# Patient Record
Sex: FEMALE | Race: WHITE | Employment: PART TIME | URBAN - METROPOLITAN AREA
[De-identification: names, ages, dates, MRNs, and addresses within clinical notes are randomized per-mention and may not be internally consistent; named-entity substitution may affect disease eponyms.]

---

## 2017-02-27 ENCOUNTER — TELEPHONE (OUTPATIENT)
Dept: NEUROLOGY | Facility: CLINIC | Age: 42
End: 2017-02-27

## 2017-03-16 ENCOUNTER — TELEPHONE (OUTPATIENT)
Dept: NEUROLOGY | Facility: CLINIC | Age: 42
End: 2017-03-16

## 2017-03-16 DIAGNOSIS — G43.001 MIGRAINE WITHOUT AURA AND WITH STATUS MIGRAINOSUS, NOT INTRACTABLE: Primary | ICD-10-CM

## 2017-03-16 DIAGNOSIS — G43.709 CHRONIC MIGRAINE WITHOUT AURA WITHOUT STATUS MIGRAINOSUS, NOT INTRACTABLE: ICD-10-CM

## 2017-03-16 RX ORDER — METHYLPREDNISOLONE 4 MG/1
TABLET ORAL
Qty: 1 KIT | Refills: 0 | Status: SHIPPED | OUTPATIENT
Start: 2017-03-16 | End: 2017-04-17 | Stop reason: ALTCHOICE

## 2017-03-16 NOTE — TELEPHONE ENCOUNTER
Call to [facility] to request lab results drawn 3-9-17  Will fax results. Received labs. To discuss with Dr. Yaron Raya. Patient with history of migraines currently on Topamax 100 mg and Nadalol 20 mg daily with migraine starting on 3-13-17.   Has not respo

## 2017-03-19 DIAGNOSIS — G43.709 CHRONIC MIGRAINE WITHOUT AURA WITHOUT STATUS MIGRAINOSUS, NOT INTRACTABLE: Primary | ICD-10-CM

## 2017-03-20 RX ORDER — TOPIRAMATE 100 MG/1
100 TABLET, FILM COATED ORAL DAILY
Qty: 30 TABLET | Refills: 2 | Status: SHIPPED | OUTPATIENT
Start: 2017-03-20 | End: 2017-07-10

## 2017-03-20 RX ORDER — TOPIRAMATE 25 MG/1
TABLET ORAL
Qty: 120 TABLET | Refills: 5 | OUTPATIENT
Start: 2017-03-20

## 2017-03-20 NOTE — TELEPHONE ENCOUNTER
Medication: Topirimate    Date of last refill: 08/10/16 with 5 addt refills  [place]     Last office visit: 12/15/2016  Due back to clinic per last office note:  RTN in 3 months by 03/15/17  Date next office visit schedu

## 2017-03-30 ENCOUNTER — TELEPHONE (OUTPATIENT)
Dept: NEUROLOGY | Facility: CLINIC | Age: 42
End: 2017-03-30

## 2017-03-30 NOTE — TELEPHONE ENCOUNTER
Left voice-mail message for patient informing her that her vitamin d level was 28.5. Recommended if she is not taking vitamin d to take vitamin d 1000 daily to call back with any questions.

## 2017-03-31 ENCOUNTER — MED REC SCAN ONLY (OUTPATIENT)
Dept: NEUROLOGY | Facility: CLINIC | Age: 42
End: 2017-03-31

## 2017-04-17 ENCOUNTER — TELEPHONE (OUTPATIENT)
Dept: NEUROLOGY | Facility: CLINIC | Age: 42
End: 2017-04-17

## 2017-04-17 ENCOUNTER — OFFICE VISIT (OUTPATIENT)
Dept: NEUROLOGY | Facility: CLINIC | Age: 42
End: 2017-04-17

## 2017-04-17 VITALS
BODY MASS INDEX: 23.3 KG/M2 | DIASTOLIC BLOOD PRESSURE: 76 MMHG | HEART RATE: 76 BPM | WEIGHT: 145 LBS | SYSTOLIC BLOOD PRESSURE: 120 MMHG | HEIGHT: 66 IN | RESPIRATION RATE: 16 BRPM

## 2017-04-17 DIAGNOSIS — G43.709 CHRONIC MIGRAINE WITHOUT AURA WITHOUT STATUS MIGRAINOSUS, NOT INTRACTABLE: ICD-10-CM

## 2017-04-17 DIAGNOSIS — G43.001 MIGRAINE WITHOUT AURA AND WITH STATUS MIGRAINOSUS, NOT INTRACTABLE: Primary | ICD-10-CM

## 2017-04-17 PROCEDURE — 99213 OFFICE O/P EST LOW 20 MIN: CPT | Performed by: OTHER

## 2017-04-17 RX ORDER — CELECOXIB 200 MG/1
200 CAPSULE ORAL DAILY PRN
Qty: 30 CAPSULE | Refills: 3 | COMMUNITY
Start: 2017-04-17

## 2017-04-17 RX ORDER — CELECOXIB 200 MG/1
200 CAPSULE ORAL 2 TIMES DAILY
Qty: 30 CAPSULE | Refills: 3 | Status: SHIPPED | OUTPATIENT
Start: 2017-04-17 | End: 2017-04-17

## 2017-04-17 RX ORDER — MULTIVIT-MIN/IRON/FOLIC ACID/K 18-600-40
4000 CAPSULE ORAL DAILY
COMMUNITY
End: 2018-06-03 | Stop reason: DRUGHIGH

## 2017-04-17 NOTE — PATIENT INSTRUCTIONS
Refill policies:    • Allow 2 business days for refills; controlled substances may take longer.   • Contact your pharmacy at least 5 days prior to running out of medication and have them send an electronic request or submit request through the “request re insurance carrier to obtain pre-certification or prior authorization. Unfortunately, [facility] has seen an increase in denial of payment even though the procedure/test has been pre-certified.   You are strongly encouraged to contact your insurance carrier to v

## 2017-04-17 NOTE — PROGRESS NOTES
[facility] with Ronaldo Charles  12/21/1975  Primary Care Provider:  Destini Williamson    4/17/2017    37year old yo patient being seen for:  Migraines and on last visit had fatigue    Repla hearing fine, tongue midline  No motor and sensory findings  DTRs symmetric. No upper motor neuron signs  Cerebellar, coordination were normal  Gait normal  Tenderness in occipital area.         IMPRESSION & PLANS:  Migraine without aura and with status mi

## 2017-04-17 NOTE — TELEPHONE ENCOUNTER
Pharmacy clarifying orders for Celebrex. Per chart notes, patient to receive Celebrex 200 mg daily, prn. Pharmacy notified. Epic to reflect.

## 2017-04-17 NOTE — PROGRESS NOTES
Patient here to follow up regarding migraines. States the headaches have been ok since last visit. Here to discuss the next steps.

## 2017-06-02 DIAGNOSIS — G43.709 CHRONIC MIGRAINE WITHOUT AURA WITHOUT STATUS MIGRAINOSUS, NOT INTRACTABLE: Primary | ICD-10-CM

## 2017-06-02 RX ORDER — ELETRIPTAN HYDROBROMIDE 40 MG/1
TABLET, FILM COATED ORAL
Qty: 10 TABLET | Refills: 5 | Status: SHIPPED | OUTPATIENT
Start: 2017-06-02 | End: 2018-04-02

## 2017-06-02 NOTE — TELEPHONE ENCOUNTER
Medication: Relpak    Date of last refill: 06/13/16 with 2 addt refills  [place]     Last office visit: 4/17/2017  Due back to clinic per last office note:  RTN in 6 months by 10/17/17  Date next office visit scheduled:

## 2017-07-10 DIAGNOSIS — G43.709 CHRONIC MIGRAINE WITHOUT AURA WITHOUT STATUS MIGRAINOSUS, NOT INTRACTABLE: ICD-10-CM

## 2017-07-10 NOTE — TELEPHONE ENCOUNTER
Medication:  Topiramate 100 mg    Date of last refill: 3/20/2017  [place]    Last office visit: 4/17/2017  Due back to clinic per last office note:  RTC in 6-8 months  Date next office visit schedu

## 2017-07-11 RX ORDER — TOPIRAMATE 100 MG/1
100 TABLET, FILM COATED ORAL DAILY
Qty: 30 TABLET | Refills: 5 | Status: SHIPPED | OUTPATIENT
Start: 2017-07-11 | End: 2018-04-10

## 2017-08-04 DIAGNOSIS — G43.709 CHRONIC MIGRAINE WITHOUT AURA WITHOUT STATUS MIGRAINOSUS, NOT INTRACTABLE: Primary | ICD-10-CM

## 2017-08-04 DIAGNOSIS — IMO0002 CHRONIC MIGRAINE: ICD-10-CM

## 2017-08-04 RX ORDER — NADOLOL 20 MG/1
20 TABLET ORAL DAILY
Qty: 30 TABLET | Refills: 5 | Status: SHIPPED | OUTPATIENT
Start: 2017-08-04 | End: 2018-05-31

## 2017-08-04 RX ORDER — NADOLOL 20 MG/1
20 TABLET ORAL DAILY
Qty: 30 TABLET | Refills: 4 | Status: CANCELLED | OUTPATIENT
Start: 2017-08-04

## 2017-08-04 RX ORDER — METHYLPREDNISOLONE 4 MG/1
TABLET ORAL
Qty: 1 PACKAGE | Refills: 0 | Status: SHIPPED | OUTPATIENT
Start: 2017-08-04 | End: 2018-05-31

## 2017-08-04 NOTE — TELEPHONE ENCOUNTER
Medication:  Nadolol 20 mg     Date of last refill: 10/24/16 with 5 addt refills  [place]     Last office visit: 4/17/2017  Due back to clinic per last office note:  RTC in 6-8 months  Date next of

## 2018-04-02 DIAGNOSIS — G43.709 CHRONIC MIGRAINE WITHOUT AURA WITHOUT STATUS MIGRAINOSUS, NOT INTRACTABLE: ICD-10-CM

## 2018-04-02 RX ORDER — ELETRIPTAN HYDROBROMIDE 40 MG/1
TABLET, FILM COATED ORAL
Qty: 10 TABLET | Refills: 0 | Status: SHIPPED | OUTPATIENT
Start: 2018-04-02 | End: 2018-05-20

## 2018-04-02 NOTE — TELEPHONE ENCOUNTER
Medication: Relpax 40 mg    Date of last refill: 6/2/2017  [place]    Last office visit: 4/17/2017  Due back to clinic per last office note:  RTC in 6-8 months, overdue for follow up  Date next off

## 2018-04-10 DIAGNOSIS — G43.709 CHRONIC MIGRAINE WITHOUT AURA WITHOUT STATUS MIGRAINOSUS, NOT INTRACTABLE: ICD-10-CM

## 2018-04-10 RX ORDER — TOPIRAMATE 100 MG/1
TABLET, FILM COATED ORAL
Qty: 30 TABLET | Refills: 0 | Status: SHIPPED | OUTPATIENT
Start: 2018-04-10 | End: 2018-05-20

## 2018-04-10 NOTE — TELEPHONE ENCOUNTER
Medication: Topiramate 100 mg     Date of last refill: 07/11/17 with 5 addt refills  [place]     Last office visit: 4/17/2017  Due back to clinic per last office note:  RTC in 6-8 months, overdue f

## 2018-05-20 DIAGNOSIS — G43.709 CHRONIC MIGRAINE WITHOUT AURA WITHOUT STATUS MIGRAINOSUS, NOT INTRACTABLE: ICD-10-CM

## 2018-05-20 RX ORDER — TOPIRAMATE 100 MG/1
100 TABLET, FILM COATED ORAL
Qty: 30 TABLET | Refills: 0 | Status: CANCELLED
Start: 2018-05-20

## 2018-05-21 RX ORDER — TOPIRAMATE 100 MG/1
TABLET, FILM COATED ORAL
Qty: 30 TABLET | Refills: 0 | Status: SHIPPED | OUTPATIENT
Start: 2018-05-21 | End: 2018-06-21

## 2018-05-21 RX ORDER — ELETRIPTAN HYDROBROMIDE 40 MG/1
TABLET, FILM COATED ORAL
Qty: 10 TABLET | Refills: 0 | Status: SHIPPED
Start: 2018-05-21 | End: 2018-05-31

## 2018-05-21 NOTE — TELEPHONE ENCOUNTER
From: Deanna Landaverde  Sent: 5/20/2018 4:31 PM CDT  Subject: Medication Renewal Request    Deanna Landaverde would like a refill of the following medications:     Eletriptan Hydrobromide (RELPAX) 40 MG Oral Tab Chintan Lester MD]     TOPIRAMATE 100 MG Or

## 2018-05-21 NOTE — TELEPHONE ENCOUNTER
Medication:Relpak 40 mg     Date of last refill: 04/02/18  [place]     Last office visit: 4/17/2017  Due back to clinic per last office note: [address] [facility] [address] in 6-8 months, overdue for follow up  Date next of

## 2018-05-31 ENCOUNTER — OFFICE VISIT (OUTPATIENT)
Dept: NEUROLOGY | Facility: CLINIC | Age: 43
End: 2018-05-31

## 2018-05-31 ENCOUNTER — LAB ENCOUNTER (OUTPATIENT)
Dept: LAB | Age: 43
End: 2018-05-31
Attending: PHYSICIAN ASSISTANT
Payer: COMMERCIAL

## 2018-05-31 VITALS
RESPIRATION RATE: 16 BRPM | DIASTOLIC BLOOD PRESSURE: 60 MMHG | BODY MASS INDEX: 24.91 KG/M2 | SYSTOLIC BLOOD PRESSURE: 100 MMHG | WEIGHT: 155 LBS | HEIGHT: 66 IN | HEART RATE: 80 BPM

## 2018-05-31 DIAGNOSIS — E55.9 VITAMIN D DEFICIENCY: ICD-10-CM

## 2018-05-31 DIAGNOSIS — E55.9 VITAMIN D DEFICIENCY: Primary | ICD-10-CM

## 2018-05-31 DIAGNOSIS — G43.101 MIGRAINE WITH AURA AND WITH STATUS MIGRAINOSUS, NOT INTRACTABLE: Primary | ICD-10-CM

## 2018-05-31 PROCEDURE — 99213 OFFICE O/P EST LOW 20 MIN: CPT | Performed by: PHYSICIAN ASSISTANT

## 2018-05-31 PROCEDURE — 36415 COLL VENOUS BLD VENIPUNCTURE: CPT | Performed by: PHYSICIAN ASSISTANT

## 2018-05-31 RX ORDER — FROVATRIPTAN SUCCINATE 2.5 MG/1
TABLET, FILM COATED ORAL
Qty: 12 TABLET | Refills: 3 | Status: SHIPPED | OUTPATIENT
Start: 2018-05-31 | End: 2018-12-17

## 2018-05-31 RX ORDER — DIPHENHYDRAMINE HCL 25 MG
50 TABLET ORAL EVERY 6 HOURS PRN
COMMUNITY

## 2018-05-31 NOTE — PROGRESS NOTES
[facility] [employer] with Ronaldo Charles  12/21/1975  Primary Care Provider:  Darya Cast    5/31/2018    40year old female patient being seen for: Migraines     Patient since last visit had mi •  DiphenhydrAMINE HCl 25 MG Oral Tab, Take 50 mg by mouth every 6 (six) hours as needed for Itching., Disp: , Rfl:   •  Frovatriptan Succinate 2.5 MG Oral Tab, Take 1 tab po at onset of headache, may repeat in 2 hours if headache recurs, max 2 tabs in 2 longer acting frova for abortive tx of the headaches. Can combine it with the Celebrex she was given. If it is not effective then instructed to inform us.  If at her follow-up visit headaches continue to be well controlled can consider tapering down the top notes

## 2018-05-31 NOTE — PATIENT INSTRUCTIONS
Refill policies:    • Allow 2-3 business days for refills; controlled substances may take longer.   • Contact your pharmacy at least 5 days prior to running out of medication and have them send an electronic request or submit request through the “request re entire amount billed. Precertification and Prior Authorizations: If your physician has recommended that you have a procedure or additional testing performed.   [facility]) will contact your insurance carrier to obtain pre-certi

## 2018-06-03 ENCOUNTER — TELEPHONE (OUTPATIENT)
Dept: NEUROLOGY | Facility: CLINIC | Age: 43
End: 2018-06-03

## 2018-06-03 DIAGNOSIS — E55.9 VITAMIN D DEFICIENCY: Primary | ICD-10-CM

## 2018-06-03 RX ORDER — ERGOCALCIFEROL (VITAMIN D2) 1250 MCG
CAPSULE ORAL
Qty: 4 CAPSULE | Refills: 2 | Status: SHIPPED | OUTPATIENT
Start: 2018-06-03

## 2018-06-21 DIAGNOSIS — G43.709 CHRONIC MIGRAINE WITHOUT AURA WITHOUT STATUS MIGRAINOSUS, NOT INTRACTABLE: ICD-10-CM

## 2018-06-22 NOTE — TELEPHONE ENCOUNTER
Medication: Topiramate 100 mg    Date of last refill: 05/21/18  [place]     Last office visit: 05/31/18  Due back to clinic per last office note:  RTN in 1 year by 05/31/19  Date next office visit scheduled:  No future a ( ) As needed but knows to call if there are problems  ( ) Followup for special test  /or keep scheduled appointment  Patient understands that if needed, based on condition and or test results, follow up will be readjusted        MANDEEP Goel

## 2018-06-25 RX ORDER — TOPIRAMATE 100 MG/1
TABLET, FILM COATED ORAL
Qty: 30 TABLET | Refills: 10 | Status: SHIPPED | OUTPATIENT
Start: 2018-06-25 | End: 2018-12-17

## 2018-08-13 RX ORDER — ERGOCALCIFEROL (VITAMIN D2) 1250 MCG
CAPSULE ORAL
Qty: 4 CAPSULE | Refills: 2 | Status: CANCELLED | OUTPATIENT
Start: 2018-08-13

## 2018-08-13 NOTE — TELEPHONE ENCOUNTER
Medication: Vitamin D      Date of last refill: 06/03/18 with 2 addt refills  [place]      Last office visit: 05/31/18  Due back to clinic per last office note:  RTN in 1 year by 05/31/19  Date next office visit sched ( ) As needed but knows to call if there are problems  ( ) Followup for special test  /or keep scheduled appointment  Patient understands that if needed, based on condition and or test results, follow up will be readjusted        MANDEEP Aguilar

## 2018-08-13 NOTE — TELEPHONE ENCOUNTER
LMTCB . Patient needs to get abs done on Vitamin D to check levels and refill not due since patient got a refill on 06/03/18 with 2 addt refills.

## 2018-10-12 ENCOUNTER — TELEPHONE (OUTPATIENT)
Dept: NEUROLOGY | Facility: CLINIC | Age: 43
End: 2018-10-12

## 2018-10-23 ENCOUNTER — TELEPHONE (OUTPATIENT)
Dept: NEUROLOGY | Facility: CLINIC | Age: 43
End: 2018-10-23

## 2018-10-23 NOTE — TELEPHONE ENCOUNTER
Please call to inform patient that vitamin d level is in the normal range. She can just continue an otc vitamin d 1000 units daily.

## 2018-10-23 NOTE — TELEPHONE ENCOUNTER
Called and left detailed message regarding her vitamin D level. Relayed information per [facility]. Instructed to call with questions or concerns.

## 2018-12-17 DIAGNOSIS — G43.709 CHRONIC MIGRAINE WITHOUT AURA WITHOUT STATUS MIGRAINOSUS, NOT INTRACTABLE: ICD-10-CM

## 2018-12-17 RX ORDER — FROVATRIPTAN SUCCINATE 2.5 MG/1
TABLET, FILM COATED ORAL
Qty: 36 TABLET | Refills: 1 | Status: SHIPPED | OUTPATIENT
Start: 2018-12-17 | End: 2019-08-07

## 2018-12-17 RX ORDER — TOPIRAMATE 100 MG/1
100 TABLET, FILM COATED ORAL
Qty: 90 TABLET | Refills: 1 | Status: SHIPPED | OUTPATIENT
Start: 2018-12-17 | End: 2019-08-07

## 2018-12-17 NOTE — TELEPHONE ENCOUNTER
Medication: Topiramate 100 mg & Frova 2.5 mg     Date of last refill: 06/25/18 with 10 addt refills & 05/31/18 with 3 addt refills  [place]      Last office visit: 05/31/18  Due back to clinic per last office note: Hannah Mcclure ( ) 6-8 weeks    ( ) 3-4 months     ( ) 6-8 months   ( X) yearly   ( ) As needed but knows to call if there are problems  ( ) Followup for special test  /or keep scheduled appointment  Patient understands that if needed, based on condition and or test resu

## 2019-08-07 DIAGNOSIS — G43.709 CHRONIC MIGRAINE WITHOUT AURA WITHOUT STATUS MIGRAINOSUS, NOT INTRACTABLE: ICD-10-CM

## 2019-08-08 RX ORDER — TOPIRAMATE 100 MG/1
TABLET, FILM COATED ORAL
Qty: 90 TABLET | Refills: 0 | Status: SHIPPED | OUTPATIENT
Start: 2019-08-08

## 2019-08-08 RX ORDER — FROVATRIPTAN SUCCINATE 2.5 MG/1
TABLET, FILM COATED ORAL
Qty: 36 TABLET | Refills: 0 | Status: SHIPPED | OUTPATIENT
Start: 2019-08-08

## 2019-08-08 NOTE — TELEPHONE ENCOUNTER
Medication: Topiramate 100 mg & Frova 2.5 mg     Date of last refill:   [place]18 with 1 addt refill applicable):      Last office visit: 05/31/18  Due back to clinic per last office note: Kimmy Cárdenas in 1 year by 05/31/19  Date next ( ) 6-8 weeks    ( ) 3-4 months     ( ) 6-8 months   ( X) yearly   ( ) As needed but knows to call if there are problems  ( ) Followup for special test  /or keep scheduled appointment  Patient understands that if needed, based on condition and or test resu

## (undated) NOTE — MR AVS SNAPSHOT
[facility]  [address] [facility] [address] [phone]               Thank you for choosing us for your health care visit with Hilario Chang MD.  We are glad to serve you and happy to provide you with this summary of your v ? EFFECTIVE April 1, 2017 PATIENTS MUST  THEIR OWN NARCOTIC PRESCRIPTIONS. ? Written prescriptions must be picked up in office. ? Please allow the office 48-72 hours to fill the prescription. ? Patient must present photo ID at time of . Eletriptan Hydrobromide 40 MG Tabs   TAKE 1 TABLET BY MOUTH AT ONSET OF HEADACHE. MAY REPEAT IN 2 HOURS IF HEADACHE RECURS.  MAX 2 TABLETS IN 24 HOURS   Commonly known as:  RELPAX           nadolol 20 MG Tabs   Take 1 tablet (20 mg total) by mouth